# Patient Record
Sex: MALE | Race: BLACK OR AFRICAN AMERICAN
[De-identification: names, ages, dates, MRNs, and addresses within clinical notes are randomized per-mention and may not be internally consistent; named-entity substitution may affect disease eponyms.]

---

## 2021-05-21 ENCOUNTER — DASHBOARD ENCOUNTERS (OUTPATIENT)
Age: 76
End: 2021-05-21

## 2021-05-21 ENCOUNTER — WEB ENCOUNTER (OUTPATIENT)
Dept: URBAN - METROPOLITAN AREA CLINIC 84 | Facility: CLINIC | Age: 76
End: 2021-05-21

## 2021-05-21 ENCOUNTER — OFFICE VISIT (OUTPATIENT)
Dept: URBAN - METROPOLITAN AREA CLINIC 84 | Facility: CLINIC | Age: 76
End: 2021-05-21
Payer: MEDICARE

## 2021-05-21 VITALS
HEART RATE: 55 BPM | TEMPERATURE: 97.6 F | SYSTOLIC BLOOD PRESSURE: 109 MMHG | HEIGHT: 69 IN | DIASTOLIC BLOOD PRESSURE: 70 MMHG | WEIGHT: 216 LBS | BODY MASS INDEX: 31.99 KG/M2

## 2021-05-21 DIAGNOSIS — R19.5 OCCULT BLOOD IN STOOLS: ICD-10-CM

## 2021-05-21 DIAGNOSIS — Z86.010 PERSONAL HISTORY OF COLON POLYPS: ICD-10-CM

## 2021-05-21 PROCEDURE — 99204 OFFICE O/P NEW MOD 45 MIN: CPT | Performed by: INTERNAL MEDICINE

## 2021-05-21 RX ORDER — ACYCLOVIR 800 MG/1
TABLET ORAL
Qty: 60 UNSPECIFIED | Status: ACTIVE | COMMUNITY

## 2021-05-21 RX ORDER — LOSARTAN POTASSIUM AND HYDROCHLOROTHIAZIDE 100; 25 MG/1; MG/1
TABLET, FILM COATED ORAL
Qty: 90 UNSPECIFIED | Status: ACTIVE | COMMUNITY

## 2021-05-21 RX ORDER — TRIAMCINOLONE ACETONIDE 1 MG/G
CREAM TOPICAL
Qty: 30 UNSPECIFIED | Status: ACTIVE | COMMUNITY

## 2021-05-21 RX ORDER — POLYETHYLENE GLYCOL 3350, SODIUM CHLORIDE, SODIUM BICARBONATE, POTASSIUM CHLORIDE 420; 11.2; 5.72; 1.48 G/4L; G/4L; G/4L; G/4L
AS DIRECTED POWDER, FOR SOLUTION ORAL ONCE
Qty: 1 BOTTLE | Refills: 0 | OUTPATIENT
Start: 2021-05-21 | End: 2021-05-22

## 2021-05-21 RX ORDER — GABAPENTIN 100 MG/1
CAPSULE ORAL
Qty: 270 UNSPECIFIED | Status: ACTIVE | COMMUNITY

## 2021-05-21 RX ORDER — ATORVASTATIN CALCIUM, FILM COATED 20 MG/1
TABLET ORAL
Qty: 30 UNSPECIFIED | Status: ACTIVE | COMMUNITY

## 2021-05-21 RX ORDER — FUROSEMIDE 20 MG/1
TABLET ORAL
Qty: 5 UNSPECIFIED | Status: ACTIVE | COMMUNITY

## 2021-05-21 NOTE — HPI-TODAY'S VISIT:
The patient was referred by Dr. Laron Cm for blood in the stool .   A copy of this document is being forwarded to the referring provider.  Overall he feels well.  He is very active.  He denies anorexia or weight loss.  He denies abdominal pain.  He denies LGI symptoms.  He denies LGI bleed or melena.  He had heme + stool at Dr. Crenshaw office.  He denies UGI symptoms or dysphagia.  His last colonoscopy was about 5 years ago.  He has a prior history of colon polyps.

## 2021-05-28 ENCOUNTER — OFFICE VISIT (OUTPATIENT)
Dept: URBAN - METROPOLITAN AREA SURGERY CENTER 20 | Facility: SURGERY CENTER | Age: 76
End: 2021-05-28
Payer: MEDICARE

## 2021-05-28 ENCOUNTER — TELEPHONE ENCOUNTER (OUTPATIENT)
Dept: URBAN - METROPOLITAN AREA CLINIC 92 | Facility: CLINIC | Age: 76
End: 2021-05-28

## 2021-05-28 DIAGNOSIS — D12.2 ADENOMA OF ASCENDING COLON: ICD-10-CM

## 2021-05-28 DIAGNOSIS — R19.5 ABNORMAL FECES: ICD-10-CM

## 2021-05-28 DIAGNOSIS — Z86.010 H/O ADENOMATOUS POLYP OF COLON: ICD-10-CM

## 2021-05-28 PROBLEM — 428283002 HISTORY OF POLYP OF COLON: Status: ACTIVE | Noted: 2021-05-21

## 2021-05-28 PROCEDURE — G8907 PT DOC NO EVENTS ON DISCHARG: HCPCS | Performed by: INTERNAL MEDICINE

## 2021-05-28 PROCEDURE — 45380 COLONOSCOPY AND BIOPSY: CPT | Performed by: INTERNAL MEDICINE

## 2021-05-28 RX ORDER — ATORVASTATIN CALCIUM, FILM COATED 20 MG/1
TABLET ORAL
Qty: 30 UNSPECIFIED | Status: ACTIVE | COMMUNITY

## 2021-05-28 RX ORDER — FUROSEMIDE 20 MG/1
TABLET ORAL
Qty: 5 UNSPECIFIED | Status: ACTIVE | COMMUNITY

## 2021-05-28 RX ORDER — TRIAMCINOLONE ACETONIDE 1 MG/G
CREAM TOPICAL
Qty: 30 UNSPECIFIED | Status: ACTIVE | COMMUNITY

## 2021-05-28 RX ORDER — GABAPENTIN 100 MG/1
CAPSULE ORAL
Qty: 270 UNSPECIFIED | Status: ACTIVE | COMMUNITY

## 2021-05-28 RX ORDER — ACYCLOVIR 800 MG/1
TABLET ORAL
Qty: 60 UNSPECIFIED | Status: ACTIVE | COMMUNITY

## 2021-05-28 RX ORDER — LOSARTAN POTASSIUM AND HYDROCHLOROTHIAZIDE 100; 25 MG/1; MG/1
TABLET, FILM COATED ORAL
Qty: 90 UNSPECIFIED | Status: ACTIVE | COMMUNITY

## 2021-06-22 ENCOUNTER — TELEPHONE ENCOUNTER (OUTPATIENT)
Dept: URBAN - METROPOLITAN AREA CLINIC 92 | Facility: CLINIC | Age: 76
End: 2021-06-22

## 2021-06-22 ENCOUNTER — OFFICE VISIT (OUTPATIENT)
Dept: URBAN - METROPOLITAN AREA SURGERY CENTER 20 | Facility: SURGERY CENTER | Age: 76
End: 2021-06-22
Payer: MEDICARE

## 2021-06-22 DIAGNOSIS — K29.30 CHRONIC SUPERFICIAL GASTRITIS: ICD-10-CM

## 2021-06-22 DIAGNOSIS — K21.00 ALKALINE REFLUX ESOPHAGITIS: ICD-10-CM

## 2021-06-22 PROCEDURE — 43239 EGD BIOPSY SINGLE/MULTIPLE: CPT | Performed by: INTERNAL MEDICINE

## 2021-06-22 PROCEDURE — G8907 PT DOC NO EVENTS ON DISCHARG: HCPCS | Performed by: INTERNAL MEDICINE

## 2021-06-22 RX ORDER — LOSARTAN POTASSIUM AND HYDROCHLOROTHIAZIDE 100; 25 MG/1; MG/1
TABLET, FILM COATED ORAL
Qty: 90 UNSPECIFIED | Status: ACTIVE | COMMUNITY

## 2021-06-22 RX ORDER — ACYCLOVIR 800 MG/1
TABLET ORAL
Qty: 60 UNSPECIFIED | Status: ACTIVE | COMMUNITY

## 2021-06-22 RX ORDER — ATORVASTATIN CALCIUM, FILM COATED 20 MG/1
TABLET ORAL
Qty: 30 UNSPECIFIED | Status: ACTIVE | COMMUNITY

## 2021-06-22 RX ORDER — TRIAMCINOLONE ACETONIDE 1 MG/G
CREAM TOPICAL
Qty: 30 UNSPECIFIED | Status: ACTIVE | COMMUNITY

## 2021-06-22 RX ORDER — FUROSEMIDE 20 MG/1
TABLET ORAL
Qty: 5 UNSPECIFIED | Status: ACTIVE | COMMUNITY

## 2021-06-22 RX ORDER — GABAPENTIN 100 MG/1
CAPSULE ORAL
Qty: 270 UNSPECIFIED | Status: ACTIVE | COMMUNITY

## 2025-08-19 ENCOUNTER — APPOINTMENT (OUTPATIENT)
Dept: URBAN - METROPOLITAN AREA CLINIC 50 | Facility: CLINIC | Age: 80
Setting detail: DERMATOLOGY
End: 2025-08-19

## 2025-08-19 DIAGNOSIS — D485 NEOPLASM OF UNCERTAIN BEHAVIOR OF SKIN: ICD-10-CM

## 2025-08-19 PROBLEM — D48.5 NEOPLASM OF UNCERTAIN BEHAVIOR OF SKIN: Status: ACTIVE | Noted: 2025-08-19

## 2025-08-19 PROCEDURE — ? BIOPSY BY PUNCH METHOD

## 2025-08-19 PROCEDURE — ? COUNSELING

## 2025-08-19 ASSESSMENT — LOCATION SIMPLE DESCRIPTION DERM: LOCATION SIMPLE: LEFT NOSE

## 2025-08-19 ASSESSMENT — LOCATION DETAILED DESCRIPTION DERM: LOCATION DETAILED: LEFT NASAL ALAR GROOVE

## 2025-08-19 ASSESSMENT — PAIN INTENSITY VAS: HOW INTENSE IS YOUR PAIN 0 BEING NO PAIN, 10 BEING THE MOST SEVERE PAIN POSSIBLE?: 1/10 PAIN

## 2025-08-19 ASSESSMENT — LOCATION ZONE DERM: LOCATION ZONE: NOSE
